# Patient Record
Sex: MALE | Race: BLACK OR AFRICAN AMERICAN | NOT HISPANIC OR LATINO | Employment: UNEMPLOYED | URBAN - NONMETROPOLITAN AREA
[De-identification: names, ages, dates, MRNs, and addresses within clinical notes are randomized per-mention and may not be internally consistent; named-entity substitution may affect disease eponyms.]

---

## 2018-02-17 ENCOUNTER — HOSPITAL ENCOUNTER (EMERGENCY)
Facility: HOSPITAL | Age: 18
Discharge: HOME | End: 2018-02-17
Attending: EMERGENCY MEDICINE | Admitting: EMERGENCY MEDICINE

## 2018-02-17 VITALS
SYSTOLIC BLOOD PRESSURE: 140 MMHG | WEIGHT: 160 LBS | RESPIRATION RATE: 18 BRPM | HEART RATE: 74 BPM | BODY MASS INDEX: 22.4 KG/M2 | TEMPERATURE: 98 F | HEIGHT: 71 IN | OXYGEN SATURATION: 100 % | DIASTOLIC BLOOD PRESSURE: 100 MMHG

## 2018-02-17 DIAGNOSIS — L02.01 CUTANEOUS ABSCESS OF FACE: Primary | ICD-10-CM

## 2018-02-17 PROCEDURE — 99283 EMERGENCY DEPT VISIT LOW MDM: CPT

## 2018-02-17 RX ORDER — IBUPROFEN 800 MG/1
800 TABLET ORAL ONCE
Status: COMPLETED | OUTPATIENT
Start: 2018-02-17 | End: 2018-02-17

## 2018-02-17 RX ADMIN — IBUPROFEN 800 MG: 800 TABLET, FILM COATED ORAL at 19:39

## 2018-02-18 NOTE — ED PROVIDER NOTES
Subjective   History of Present Illness  Patient presents with complaints of a superficial abscess on his left lower jaw. He states he had one in the same spot several months ago and it was drained. He goes to college in [place] and is here visiting. It started getting sore about 4 days ago, he has tried to drain it without success. He is currently on Keflex for a dental infection and his having the tooth repaired next week. It on the left lower, but he does not believe the two are related.  Review of Systems   All other systems reviewed and are negative.      History reviewed. No pertinent past medical history.    Allergies not on file    History reviewed. No pertinent surgical history.    History reviewed. No pertinent family history.    Social History     Social History   • Marital status: Single     Spouse name: N/A   • Number of children: N/A   • Years of education: N/A     Social History Main Topics   • Smoking status: Never Smoker   • Smokeless tobacco: None   • Alcohol use No   • Drug use: No   • Sexual activity: Not Asked     Other Topics Concern   • None     Social History Narrative   • None           Objective   Physical Exam   Constitutional: He appears well-developed and well-nourished.   HENT:   Head: Normocephalic and atraumatic.   Eyes: EOM are normal. Pupils are equal, round, and reactive to light.   Cardiovascular: Normal rate, regular rhythm and normal heart sounds.    Pulmonary/Chest: Effort normal and breath sounds normal.   Musculoskeletal: Normal range of motion.   Neurological: He is alert.   Skin: Skin is warm and dry.   Small abscess along the left jaw.  There is some fluctuance.   Psychiatric: He has a normal mood and affect. His behavior is normal. Judgment and thought content normal.   Nursing note and vitals reviewed.      Incision & Drainage  Date/Time: 2/18/2018 12:29 PM  Performed by: CHANTELLE JANG  Authorized by: RENA JONES     Consent:     Consent obtained:   Verbal    Consent given by:  Patient    Risks discussed:  Bleeding, incomplete drainage, pain and infection    Alternatives discussed:  No treatment  Location:     Type:  Abscess    Location:  Head    Head location:  Face  Pre-procedure details:     Skin preparation:  Chloraprep  Anesthesia (see MAR for exact dosages):     Anesthesia method:  Local infiltration    Local anesthetic:  Lidocaine 2% w/o epi  Procedure type:     Complexity:  Simple  Procedure details:     Needle aspiration: no      Incision types:  Stab incision    Incision depth:  Subcutaneous    Scalpel blade:  10    Wound management:  Probed and deloculated    Drainage:  Purulent    Drainage amount:  Moderate    Wound treatment:  Wound left open  Post-procedure details:     Patient tolerance of procedure:  Tolerated well, no immediate complications             ED Course  ED Course      I&D was performed with a moderate amount of purulent drainage.  He is already on antibiotic.  He will follow up with his primary care provider when he returns home.            MDM    Final diagnoses:   Cutaneous abscess of face            Ethel Ramon, APRN  02/19/18 0016

## 2018-02-18 NOTE — DISCHARGE INSTRUCTIONS
Incision and Drainage, Care After  Refer to this sheet in the next few weeks. These instructions provide you with information about caring for yourself after your procedure. Your health care provider may also give you more specific instructions. Your treatment has been planned according to current medical practices, but problems sometimes occur. Call your health care provider if you have any problems or questions after your procedure.  What can I expect after the procedure?  After the procedure, it is common to have:  · Pain or discomfort around your incision site.  · Drainage from your incision.  Follow these instructions at home:  · Take over-the-counter and prescription medicines only as told by your health care provider.  · If you were prescribed an antibiotic medicine, take it as told by your health care provider. Do not stop taking the antibiotic even if you start to feel better.  · Follow instructions from your health care provider about:  ¨ How to take care of your incision.  ¨ When and how you should change your packing and bandage (dressing). Wash your hands with soap and water before you change your dressing. If soap and water are not available, use hand .  ¨ When you should remove your dressing.  · Do not take baths, swim, or use a hot tub until your health care provider approves.  · Keep all follow-up visits as told by your health care provider. This is important.  · Check your incision area every day for signs of infection. Check for:  ¨ More redness, swelling, or pain.  ¨ More fluid or blood.  ¨ Warmth.  ¨ Pus or a bad smell.  Contact a health care provider if:  · Your cyst or abscess returns.  · You have a fever.  · You have more redness, swelling, or pain around your incision.  · You have more fluid or blood coming from your incision.  · Your incision feels warm to the touch.  · You have pus or a bad smell coming from your incision.  Get help right away if:  · You have severe pain or  bleeding.  · You cannot eat or drink without vomiting.  · You have decreased urine output.  · You become short of breath.  · You have chest pain.  · You cough up blood.  · The area where the incision and drainage occurred becomes numb or it tingles.  This information is not intended to replace advice given to you by your health care provider. Make sure you discuss any questions you have with your health care provider.  Document Released: 03/11/2013 Document Revised: 05/19/2017 Document Reviewed: 10/07/2016  [employer] Patient Education © 2017 [employer]      Skin Abscess  A skin abscess is an infected area on or under your skin that contains pus and other material. An abscess can happen almost anywhere on your body. Some abscesses break open (rupture) on their own. Most continue to get worse unless they are treated. The infection can spread deeper into the body and into your blood, which can make you feel sick. Treatment usually involves draining the abscess.  Follow these instructions at home:  Abscess Care   · If you have an abscess that has not drained, place a warm, clean, wet washcloth over the abscess several times a day. Do this as told by your doctor.  · Follow instructions from your doctor about how to take care of your abscess. Make sure you:  ¨ Cover the abscess with a bandage (dressing).  ¨ Change your bandage or gauze as told by your doctor.  ¨ Wash your hands with soap and water before you change the bandage or gauze. If you cannot use soap and water, use hand .  · Check your abscess every day for signs that the infection is getting worse. Check for:  ¨ More redness, swelling, or pain.  ¨ More fluid or blood.  ¨ Warmth.  ¨ More pus or a bad smell.  Medicines     · Take over-the-counter and prescription medicines only as told by your doctor.  · If you were prescribed an antibiotic medicine, take it as told by your doctor. Do not stop taking the antibiotic even if you start to feel  better.  General instructions   · To avoid spreading the infection:  ¨ Do not share personal care items, towels, or hot tubs with others.  ¨ Avoid making skin-to-skin contact with other people.  · Keep all follow-up visits as told by your doctor. This is important.  Contact a doctor if:  · You have more redness, swelling, or pain around your abscess.  · You have more fluid or blood coming from your abscess.  · Your abscess feels warm when you touch it.  · You have more pus or a bad smell coming from your abscess.  · You have a fever.  · Your muscles ache.  · You have chills.  · You feel sick.  Get help right away if:  · You have very bad (severe) pain.  · You see red streaks on your skin spreading away from the abscess.  This information is not intended to replace advice given to you by your health care provider. Make sure you discuss any questions you have with your health care provider.  Document Released: 06/05/2009 Document Revised: 08/13/2017 Document Reviewed: 10/26/2016  [employer] Interactive Patient Education © 2017 [employer]